# Patient Record
Sex: FEMALE | Race: WHITE
[De-identification: names, ages, dates, MRNs, and addresses within clinical notes are randomized per-mention and may not be internally consistent; named-entity substitution may affect disease eponyms.]

---

## 2018-06-01 ENCOUNTER — HOSPITAL ENCOUNTER (OUTPATIENT)
Dept: HOSPITAL 80 - FIMAGING | Age: 23
End: 2018-06-01
Attending: PSYCHIATRY & NEUROLOGY
Payer: COMMERCIAL

## 2018-06-01 DIAGNOSIS — R91.1: ICD-10-CM

## 2018-06-01 DIAGNOSIS — M51.24: ICD-10-CM

## 2018-06-01 DIAGNOSIS — R29.2: Primary | ICD-10-CM

## 2018-06-01 PROCEDURE — A9585 GADOBUTROL INJECTION: HCPCS

## 2018-06-06 ENCOUNTER — HOSPITAL ENCOUNTER (EMERGENCY)
Dept: HOSPITAL 80 - FED | Age: 23
Discharge: HOME | End: 2018-06-06
Payer: COMMERCIAL

## 2018-06-06 VITALS — SYSTOLIC BLOOD PRESSURE: 131 MMHG | DIASTOLIC BLOOD PRESSURE: 92 MMHG
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
## 2018-06-06 DIAGNOSIS — S01.81XA: Primary | ICD-10-CM

## 2018-06-06 DIAGNOSIS — R55: ICD-10-CM

## 2018-06-06 PROCEDURE — 0HQ1XZZ REPAIR FACE SKIN, EXTERNAL APPROACH: ICD-10-PCS

## 2018-06-06 NOTE — EDPHY
H & P


Smoking Status: Never smoked


Time Seen by Provider: 06/06/18 22:34


HPI/ROS: 





CHIEF COMPLAINT:  Syncopal episode, forehead laceration





HISTORY OF PRESENT ILLNESS:  22-year-old female sustained a more than likely 

vasovagal syncopal episode episode while she was watching a family member 

receive sutures in the emergency department.  I was placing sutures on said 

family member and I witnessed the patient have a syncopal episode from the 

opposite side of the bed, impacting her head.  No loss of consciousness.  Awoke 

within a few seconds.  Nursing staff was in the room at the time this happened 

as well and assisted the patient to sit up.  When I examine the patient she is 

awake alert oriented person place time events, denies alcohol or drug use.  

Does state that she did not have dinner this evening.  Denies him headache, 

nausea, vomiting, midline C-spine pain, peripheral paresthesia, weakness, 

numbness.








REVIEW OF SYSTEMS:


A ten point review of systems was performed and is negative with the exception 

of the items mentioned in the HPI








PAST MEDICAL/SURGICAL HISTORY: no anticoagulant use, no relevant medical/

surgical history.  Tetanus up-to-date





SOCIAL HISTORY: denies alcohol use at time of incident





*********





PHYSICAL EXAM 





1) GENERAL: Well-developed, well-nourished, alert and oriented.  Appears to be 

in no acute distress. Answering questions appropriately.


2) HEAD: Normocephalic, 2.5 cm linear well-demarcated laceration left frontal 

region.


3) HEENT: Pupils equal, round, reactive to light bilaterally. Negative Horners. 

Nasopharynx, oropharynx, clear.   No deformity or angulation of nose.  No 

septal hematoma.  No rhinorrhea. No oral trauma. Ears bilaterally with normal 

tympanic membranes.  No hemotympanum.  No fluid or blood in the external 

auditory canal.  No raccoon eyes.  No Vu sign.  Teeth are normally aligned 

with no gross malocclusion, TMJ bilaterally nontender, facial bones nontender 

including the zygomatic arch, maxilla mandible.


4) NECK:  No cervical collar is on. Posterior cervical spine is nontender, no 

stepoff, no effusion. Full range of motion which does not elicit any midline 

cervical spine pain, no posterior midline tenderness, no step-off.


5) LUNGS: Clear to auscultation bilaterally, no wheezes, no rhonchi, no 

retractions.  No obvious signs of trauma.  No chest wall pain.  No flaring, no 

grunting.  Moving symmetrically.  No crepitus. 


6) HEART: [Regular rate and rhythm, 


7) ABDOMEN: No guarding, no rebound, no focal tenderness, no peritoneal signs, 

no signs of trauma, no ecchymosis


8) MUSCULOSKELETAL: Moving all extremities, no focal areas of tenderness, no 

obvious trauma.


9) BACK:  No midline vertebral tenderness, no fluctuance, no step-off, no 

obvious trauma, no visual or palpable abnormality.


10) SKIN:  forehead laceration


11) NEURO: Awake, alert, and oriented to person, place and time.  Answers 

questions appropriately.  There were no obvious focal neurologic abnormalities.

  No cerebellar dysfunction.  Normal steady gait.  Upper and lower extremities 

bilaterally with strength 5 / 5, reflexes 2+.


***********





DIFFERENTIAL DIAGNOSIS:  Not necessarily in any particular order, my 

differential diagnosis includes, but is not limited to, concussion, skull 

fracture, intraparenchymal contusion, subarachnoid, subdural and epidural 

hematoma.  The patient understands that this diagnosis is provisional and can 

never be 100% accurate.  (TENISHA Perera)


Constitutional: 





 Initial Vital Signs











Temperature (C)  36.7 C   06/06/18 22:29


 


Heart Rate  100   06/06/18 22:29


 


Respiratory Rate  20   06/06/18 22:29


 


Blood Pressure  133/94 H  06/06/18 22:29


 


O2 Sat (%)  98   06/06/18 22:29








 











O2 Delivery Mode               Room Air














Allergies/Adverse Reactions: 


 





Sulfa (Sulfonamide Antibiotics) Allergy (Verified 06/06/18 22:29)


 











MDM/Departure





- [facility]


Procedures: 





Procedure:  Laceration repair with tissue adhesive


Verbal consent was obtained from the patient.  The 2.5 cm laceration on the 

left eyebrow region was scrubbed and explored to its base with a gloved finger. 

No foreign body seen, no foreign bodies palpated.  There were no deep 

structures involved.    The wound was repaired with tissue adhesive.  The 

procedure was performed by myself.  Patient has been informed that scarring 

will occur, although every effort has been made to minimize this. (TENISHA Perera

)


ED Course/Re-evaluation: 





I think this patient's syncopal episode is more likely secondary to acute 

vasovagal syncope secondary to watching her family member receive sutures.  I 

witness the patient's syncopal episode.  Nursing staff and I were able to 

immediately a render assistance to the patient.  She is answering questions 

appropriately.  There is no seizure activity.  She is answering questions 

appropriate this time.  I do not think that imaging studies of the head are 

currently indicated.  Her wound has been closed with tissue adhesive.  She 

feels comfortable being discharged with usual and customary head injury 

precautions and instructions.  I believe her to have decision-making capacity.  

I saw this patient independently based on established practice protocols.  Care 

of patient under supervision of  secondary supervising physician Dr Castro . (

TENISHA Perera)





- Depart


Disposition: Home, Routine, Self-Care


Clinical Impression: 


 Vasovagal syncope, Laceration of forehead





Condition: Good


Instructions:  Laceration (ED), Syncope (ED), Skin Adhesive Care (ED)


Additional Instructions: 


ALTHOUGH THERE IS NO EVIDENCE OF SERIOUS HEAD INJURY AT THIS TIME, DELAYED 

SIGNS CAN APPEAR 24 TO 48 HOURS AFTER INJURY.  PLEASE RETURN TO THE EMERGENCY 

DEPARTMENT (ED) IMMEDIATELY IF YOU HAVE INCREASED HEADACHE, PERSISTENT HEADACHE

, VOMITING, WEAKNESS, CONFUSION OR VISUAL PROBLEMS.  WE RECOMMEND THAT YOU DO 

NOT RESUME CONTACT SPORTS OR ACTIVITIES THAT TAKE COORDINATION OR BALANCE SUCH 

AS SKIING OR RIDING A BICYCLE UNTIL CLEARED TO DO SO BY YOUR DOCTOR OR BY A 

NEUROLOGIST.





Referrals: 


Follow-up, with primary care provider in 2-3 days [Other] - As per Instructions

## 2018-06-15 ENCOUNTER — HOSPITAL ENCOUNTER (OUTPATIENT)
Dept: HOSPITAL 80 - FIMAGING | Age: 23
End: 2018-06-15
Payer: COMMERCIAL

## 2018-06-15 DIAGNOSIS — R91.1: Primary | ICD-10-CM

## 2018-09-16 ENCOUNTER — HOSPITAL ENCOUNTER (OUTPATIENT)
Dept: HOSPITAL 80 - FIMAGING | Age: 23
End: 2018-09-16
Payer: COMMERCIAL

## 2018-09-16 DIAGNOSIS — R91.1: Primary | ICD-10-CM

## 2019-06-08 ENCOUNTER — HOSPITAL ENCOUNTER (OUTPATIENT)
Dept: HOSPITAL 80 - FIMAGING | Age: 24
End: 2019-06-08
Payer: COMMERCIAL